# Patient Record
Sex: FEMALE | Race: WHITE | NOT HISPANIC OR LATINO | URBAN - NONMETROPOLITAN AREA
[De-identification: names, ages, dates, MRNs, and addresses within clinical notes are randomized per-mention and may not be internally consistent; named-entity substitution may affect disease eponyms.]

---

## 2020-03-19 NOTE — PATIENT DISCUSSION
CATARACTS, OU - NOT VISUALLY SIGNIFICANT. WILL COMPLETE REFRACTION AT FOLLOW-UP, PT TO CALL BACK TO SCHEDULE.

## 2021-08-13 ENCOUNTER — IMPORTED ENCOUNTER (OUTPATIENT)
Dept: URBAN - NONMETROPOLITAN AREA CLINIC 1 | Facility: CLINIC | Age: 71
End: 2021-08-13

## 2021-08-13 PROBLEM — H40.013: Noted: 2021-08-13

## 2021-08-13 PROBLEM — E11.9: Noted: 2021-08-13

## 2021-08-13 PROCEDURE — 92004 COMPRE OPH EXAM NEW PT 1/>: CPT

## 2021-08-13 NOTE — PATIENT DISCUSSION
DM s DR-Stressed the importance of keeping blood sugars under control blood pressure under control and weight normalization and regular visits with PCP. -Explained the possible effects of poorly controlled diabetes and the damage that diabetes can cause to ocular health. -Patient to check HgbA1C.-Pt instructed to contact our office with any vision changes. Glaucoma Suspect-Based on -Appears stable at this time.-Continue to monitor with exams and testing.

## 2022-04-09 ASSESSMENT — VISUAL ACUITY
OS_SC: 20/30
OU_CC: 20/25
OS_CC: 20/30
OD_CC: 20/40
OU_SC: 20/30
OD_SC: 20/30

## 2022-04-09 ASSESSMENT — TONOMETRY
OD_IOP_MMHG: 15
OS_IOP_MMHG: 15

## 2022-10-18 ENCOUNTER — ESTABLISHED PATIENT (OUTPATIENT)
Dept: RURAL CLINIC 1 | Facility: CLINIC | Age: 72
End: 2022-10-18

## 2022-10-18 DIAGNOSIS — H40.013: ICD-10-CM

## 2022-10-18 DIAGNOSIS — H25.13: ICD-10-CM

## 2022-10-18 DIAGNOSIS — E11.9: ICD-10-CM

## 2022-10-18 PROCEDURE — 92014 COMPRE OPH EXAM EST PT 1/>: CPT

## 2022-10-18 PROCEDURE — 92015 DETERMINE REFRACTIVE STATE: CPT

## 2022-10-18 ASSESSMENT — VISUAL ACUITY
OD_CC: 20/25
OU_SC: 20/30
OU_CC: 20/25+2
OS_CC: 20/25

## 2022-10-18 ASSESSMENT — TONOMETRY
OS_IOP_MMHG: 15
OD_IOP_MMHG: 15

## 2025-04-04 ENCOUNTER — COMPREHENSIVE EXAM (OUTPATIENT)
Age: 75
End: 2025-04-04

## 2025-04-04 DIAGNOSIS — H40.013: ICD-10-CM

## 2025-04-04 DIAGNOSIS — H25.13: ICD-10-CM

## 2025-04-04 DIAGNOSIS — E11.9: ICD-10-CM

## 2025-04-04 PROCEDURE — 92014 COMPRE OPH EXAM EST PT 1/>: CPT
